# Patient Record
Sex: FEMALE | Race: WHITE | NOT HISPANIC OR LATINO | Employment: OTHER | RURAL
[De-identification: names, ages, dates, MRNs, and addresses within clinical notes are randomized per-mention and may not be internally consistent; named-entity substitution may affect disease eponyms.]

---

## 2023-04-17 ENCOUNTER — OFFICE VISIT (OUTPATIENT)
Dept: CARDIOLOGY | Facility: CLINIC | Age: 83
End: 2023-04-17
Payer: MEDICARE

## 2023-04-17 VITALS
HEART RATE: 92 BPM | HEIGHT: 64 IN | SYSTOLIC BLOOD PRESSURE: 144 MMHG | WEIGHT: 138 LBS | BODY MASS INDEX: 23.56 KG/M2 | DIASTOLIC BLOOD PRESSURE: 51 MMHG

## 2023-04-17 DIAGNOSIS — E78.2 HYPERLIPEMIA, MIXED: ICD-10-CM

## 2023-04-17 DIAGNOSIS — I10 HYPERTENSION, ESSENTIAL: ICD-10-CM

## 2023-04-17 DIAGNOSIS — R07.9 CHEST PAIN, UNSPECIFIED TYPE: Primary | ICD-10-CM

## 2023-04-17 PROCEDURE — 99203 OFFICE O/P NEW LOW 30 MIN: CPT | Performed by: SPECIALIST

## 2023-04-17 PROCEDURE — 1159F MED LIST DOCD IN RCRD: CPT | Performed by: SPECIALIST

## 2023-04-17 PROCEDURE — 1160F RVW MEDS BY RX/DR IN RCRD: CPT | Performed by: SPECIALIST

## 2023-04-17 RX ORDER — ATORVASTATIN CALCIUM 10 MG/1
1 TABLET, FILM COATED ORAL DAILY
COMMUNITY
Start: 2023-03-12

## 2023-04-17 RX ORDER — PANTOPRAZOLE SODIUM 40 MG/1
TABLET, DELAYED RELEASE ORAL
COMMUNITY
Start: 2023-03-30

## 2023-04-17 RX ORDER — LOSARTAN POTASSIUM 100 MG/1
1 TABLET ORAL DAILY
COMMUNITY
Start: 2023-03-12

## 2023-04-17 RX ORDER — FLUTICASONE PROPIONATE 50 MCG
SPRAY, SUSPENSION (ML) NASAL
COMMUNITY
Start: 2023-04-04

## 2023-04-17 NOTE — PROGRESS NOTES
[facility]   Cardiology Consult Note    Patient Name: Lul Meek  : 1940  Referring Physician: SALAS Lee  Subjective   Subjective     Reason for Consult/ Chief Complaint:   Chief Complaint   Patient presents with   • Chest Pain     Was having pain substernal, but since taking a new medication for heartburn and now it is ok       HPI:  Lul Meek is a 82 y.o. female with history of chest pain in the epigastric region rating into the past a few weeks ago.  Chest pain is substernal, aching, relieved spontaneously.  No exertional angina.  No shortness of breath.  No PND no orthopnea.    Review of Systems:    Constitutional no fever,  no weight loss   Skin no rash   Otolaryngeal no difficulty swallowing   Cardiovascular See HPI   Pulmonary no cough, no sputum production   Gastrointestinal no constipation, no diarrhea   Genitourinary no dysuria, no hematuria   Hematologic no easy bruisability, no abnormal bleeding   Musculoskeletal no muscle pain   Neurologic no dizziness, no falls       Personal History     Past Medical History:  Past Medical History:   Diagnosis Date   • Hyperlipidemia    • Hypertension        Family History:   Family History   Problem Relation Age of Onset   • Heart failure Father        Social History:  reports that she has never smoked. She has never used smokeless tobacco. She reports that she does not drink alcohol and does not use drugs.    Home Medications:  atorvastatin, fluticasone, losartan, metFORMIN, and pantoprazole    Allergies:  No Known Allergies    Objective    Objective     Vitals:   Heart Rate:  [90-92] 92  BP: (144-147)/(51-52) 144/51  Body mass index is 23.69 kg/m².  PHYSICAL EXAM:    General Appearance:   · well developed  · well nourished  HENT:   · oropharynx moist  · lips not cyanotic  Neck:  · thyroid not enlarged  · supple  Respiratory:  · no respiratory distress  · normal breath sounds  · no rales  Cardiovascular:  · no jugular venous  distention  · regular rhythm  · apical impulse normal  · S1 normal, S2 normal  · no S3, no S4   · no murmur  · no rub, no thrill  · carotid pulses normal; no bruit  · pedal pulses normal  · lower extremity edema: none    Skin:   · warm, dry  Psychiatric:  · judgement and insight appropriate  · normal mood and affect    RESULTS:    EKG reviewed by me and shows sinus rhythm       Result Review    Result Review:  I have personally reviewed the available results:  [x]  Laboratory  [x]  EKG/Telemetry   [x]  Cardiology/Vascular   [x] Medications  [x]  Old records      Procedures     Impression/Plan  1.  Precordial atypical chest pain with positive risk factors: Sestamibi stress test to evaluate any significant ischemia.  Echocardiogram evaluate left ventricular systolic function.  2.  Essential hypertension controlled: Continue losartan 100 mg once a day.  Monitor blood pressure regularly.  3.  Hyperlipidemia: Continue Lipitor 10 mg once a day.  Lipid and hepatic profile        Electronically signed by Edgar Bolanos MD, 04/17/23, 11:15 AM EDT.

## 2023-05-23 ENCOUNTER — TELEPHONE (OUTPATIENT)
Dept: CARDIOLOGY | Facility: CLINIC | Age: 83
End: 2023-05-23
Payer: MEDICARE

## 2023-05-23 NOTE — TELEPHONE ENCOUNTER
Notifed pt echocardiogram shows normal heart function and no significant valve abnormality. Keep follow up as scheduled. Notify office if symptoms persist/worsen

## 2023-05-23 NOTE — TELEPHONE ENCOUNTER
----- Message from Soheila Keith sent at 5/22/2023  1:16 PM EDT -----    ----- Message -----  From: Edgar Bolanos MD  Sent: 5/22/2023   1:10 PM EDT  To: Soheila Keith    Notify pt echocardiogram shows normal heart function and no significant valve abnormality. Keep follow up as scheduled. Notify office if symptoms persist/worsen

## 2023-05-30 ENCOUNTER — HOSPITAL ENCOUNTER (OUTPATIENT)
Dept: NUCLEAR MEDICINE | Facility: HOSPITAL | Age: 83
Discharge: HOME | End: 2023-05-30

## 2023-05-30 DIAGNOSIS — R07.9 CHEST PAIN, UNSPECIFIED TYPE: ICD-10-CM

## 2023-05-30 LAB
BH CV IMMEDIATE POST TECH DATA BLOOD PRESSURE: NORMAL MMHG
BH CV IMMEDIATE POST TECH DATA HEART RATE: 97 BPM
BH CV IMMEDIATE POST TECH DATA OXYGEN SATS: 98 %
BH CV REST NUCLEAR ISOTOPE DOSE: 9.4 MCI
BH CV SIX MINUTE RECOVERY TECH DATA BLOOD PRESSURE: NORMAL
BH CV SIX MINUTE RECOVERY TECH DATA HEART RATE: 86 BPM
BH CV SIX MINUTE RECOVERY TECH DATA OXYGEN SATURATION: 97 %
BH CV STRESS BP STAGE 1: NORMAL
BH CV STRESS COMMENTS STAGE 1: NORMAL
BH CV STRESS DOSE REGADENOSON STAGE 1: 0.4
BH CV STRESS DURATION MIN STAGE 1: 0
BH CV STRESS DURATION SEC STAGE 1: 10
BH CV STRESS HR STAGE 1: 80
BH CV STRESS NUCLEAR ISOTOPE DOSE: 35.5 MCI
BH CV STRESS O2 STAGE 1: 96
BH CV STRESS PROTOCOL 1: NORMAL
BH CV STRESS RECOVERY BP: NORMAL MMHG
BH CV STRESS RECOVERY HR: 86 BPM
BH CV STRESS RECOVERY O2: 97 %
BH CV STRESS STAGE 1: 1
BH CV THREE MINUTE POST TECH DATA BLOOD PRESSURE: NORMAL MMHG
BH CV THREE MINUTE POST TECH DATA HEART RATE: 86 BPM
BH CV THREE MINUTE POST TECH DATA OXYGEN SATURATION: 98 %
LV EF NUC BP: 50 %
MAXIMAL PREDICTED HEART RATE: 140 BPM
PERCENT MAX PREDICTED HR: 69.29 %
STRESS BASELINE BP: NORMAL MMHG
STRESS BASELINE HR: 75 BPM
STRESS O2 SAT REST: 95 %
STRESS PERCENT HR: 82 %
STRESS POST O2 SAT PEAK: 96 %
STRESS POST PEAK BP: NORMAL MMHG
STRESS POST PEAK HR: 97 BPM
STRESS TARGET HR: 119 BPM

## 2023-05-30 PROCEDURE — 25010000002 REGADENOSON 0.4 MG/5ML SOLUTION: Performed by: SPECIALIST

## 2023-05-30 PROCEDURE — A9502 TC99M TETROFOSMIN: HCPCS | Performed by: SPECIALIST

## 2023-05-30 PROCEDURE — 0 TECHNETIUM TETROFOSMIN KIT: Performed by: SPECIALIST

## 2023-05-30 PROCEDURE — 78452 HT MUSCLE IMAGE SPECT MULT: CPT

## 2023-05-30 PROCEDURE — 93017 CV STRESS TEST TRACING ONLY: CPT

## 2023-05-30 RX ORDER — REGADENOSON 0.08 MG/ML
0.4 INJECTION, SOLUTION INTRAVENOUS
Status: COMPLETED | OUTPATIENT
Start: 2023-05-30 | End: 2023-05-30

## 2023-05-30 RX ADMIN — TETROFOSMIN 1 DOSE: 1.38 INJECTION, POWDER, LYOPHILIZED, FOR SOLUTION INTRAVENOUS at 09:26

## 2023-05-30 RX ADMIN — TETROFOSMIN 1 DOSE: 1.38 INJECTION, POWDER, LYOPHILIZED, FOR SOLUTION INTRAVENOUS at 07:41

## 2023-05-30 RX ADMIN — REGADENOSON 0.4 MG: 0.08 INJECTION, SOLUTION INTRAVENOUS at 09:26

## 2023-05-31 ENCOUNTER — TELEPHONE (OUTPATIENT)
Dept: CARDIOLOGY | Facility: CLINIC | Age: 83
End: 2023-05-31

## 2023-05-31 NOTE — TELEPHONE ENCOUNTER
HARRISON patient regarding results and recommendations. Voiced understanding.   Patient denies any further CP. Patient advised to notify office if she starts having symptoms.

## 2023-05-31 NOTE — TELEPHONE ENCOUNTER
----- Message from SALAS Lewis sent at 5/30/2023  4:23 PM EDT -----  Notify pt stress test shows small fixed defect at the apex with no significant ischemia. Find out if she is having new or worsening chest pain. Start aspirin 81 mg daily and continue other medications.

## 2024-09-06 NOTE — PROGRESS NOTES
[facility]  Cardiology progress Note    Patient Name: Lul Meek  : 1940    CHIEF COMPLAINT  Hypertension        Subjective   Subjective     HISTORY OF PRESENT ILLNESS    Lul Meek is a 83 y.o. female with history of hypertension.  No chest pain.  Recently in the emergency room with dizziness and near syncopal spell.    REVIEW OF SYSTEMS    Constitutional:    No fever, no weight loss  Skin:     No rash  Otolaryngeal:    No difficulty swallowing  Cardiovascular: See HPI.  Pulmonary:    No cough, no sputum production    Personal History     Social History:    reports that she has never smoked. She has never used smokeless tobacco. She reports that she does not drink alcohol and does not use drugs.    Home Medications:  Current Outpatient Medications on File Prior to Visit   Medication Sig    atorvastatin (LIPITOR) 10 MG tablet Take 1 tablet by mouth Daily.    fluticasone (FLONASE) 50 MCG/ACT nasal spray USE 2 SPRAY(S) IN EACH NOSTRIL RIGHT HAND TO LEFT NOSTRIL AND LEFT HAND TO RIGHT NOSTRIL ONCE DAILY    losartan (COZAAR) 100 MG tablet Take 1 tablet by mouth Daily.    metFORMIN (GLUCOPHAGE) 500 MG tablet Take 1 tablet by mouth Every 12 (Twelve) Hours.    pantoprazole (PROTONIX) 40 MG EC tablet TAKE 1 TABLET BY MOUTH BEFORE BREAKFAST, STOP NEXIUM     No current facility-administered medications on file prior to visit.       Past Medical History:   Diagnosis Date    Hyperlipidemia     Hypertension        Allergies:  No Known Allergies    Objective    Objective       Vitals:   BP: ()/()   Arterial Line BP: ()/()   There is no height or weight on file to calculate BMI.     PHYSICAL EXAM:    General Appearance:   well developed  well nourished  HENT:   oropharynx moist  lips not cyanotic  Neck:  thyroid not enlarged  supple  Respiratory:  no respiratory distress  normal breath sounds  no rales  Cardiovascular:  no jugular venous distention  regular rhythm  apical impulse normal  S1 normal, S2  normal  no S3, no S4   no murmur  no rub, no thrill  carotid pulses normal; no bruit  pedal pulses normal  lower extremity edema: none    Skin:   warm, dry  Psychiatric:  judgement and insight appropriate  normal mood and affect        Result Review:  I have personally reviewed the available results from  [x]  Laboratory  [x]  EKG  [x]  Cardiology  [x]  Medications  [x]  Old records  []  Other:     Procedures    Results for orders placed in visit on 05/22/23    Adult Transthoracic Echo Complete W/ Cont if Necessary Per Protocol    Interpretation Summary  Mild left ventricular hypertrophy with adequate left ventricular systolic function ejection fraction 53%.  Fibrocalcific mitral and aortic valves.  Mild to moderate aortic regurgitation.  Mild mild regurgitation.  Mild tricuspid regurgitation.     Impression/Plan:  1.  Essential hypertension controlled: Continue losartan 100 mg once a day.  Monitor blood pressure regularly.  2.  Mixed hyperlipidemia: Continue Lipitor 10 mg once a day.  Monitor lipid and hepatic profile.  3.  Precordial atypical chest pain: Negative stress test.  4.  Mild aortic regurgitation: Asymptomatic.  5.  Dizziness/near syncopal spell: 48-hour Holter monitoring.  Reviewed her reports from the emergency room.        Edgar Bolanos MD   09/06/24   13:18 EDT

## 2024-09-09 ENCOUNTER — OFFICE VISIT (OUTPATIENT)
Dept: CARDIOLOGY | Facility: CLINIC | Age: 84
End: 2024-09-09
Payer: MEDICARE

## 2024-09-09 VITALS
HEART RATE: 90 BPM | BODY MASS INDEX: 25.34 KG/M2 | HEIGHT: 63 IN | WEIGHT: 143 LBS | SYSTOLIC BLOOD PRESSURE: 136 MMHG | DIASTOLIC BLOOD PRESSURE: 66 MMHG

## 2024-09-09 DIAGNOSIS — I35.1 NONRHEUMATIC AORTIC VALVE INSUFFICIENCY: ICD-10-CM

## 2024-09-09 DIAGNOSIS — R42 DIZZINESS: ICD-10-CM

## 2024-09-09 DIAGNOSIS — I10 HYPERTENSION, ESSENTIAL: Primary | ICD-10-CM

## 2024-09-09 DIAGNOSIS — E78.2 HYPERLIPEMIA, MIXED: ICD-10-CM

## 2024-09-09 PROCEDURE — 99214 OFFICE O/P EST MOD 30 MIN: CPT | Performed by: SPECIALIST

## 2024-09-09 RX ORDER — MONTELUKAST SODIUM 10 MG/1
TABLET ORAL
COMMUNITY
Start: 2024-08-18

## 2024-09-25 ENCOUNTER — HOSPITAL ENCOUNTER (INPATIENT)
Facility: HOSPITAL | Age: 84
LOS: 1 days | Discharge: HOME | End: 2024-09-27
Attending: INTERNAL MEDICINE | Admitting: INTERNAL MEDICINE
Payer: MEDICARE

## 2024-09-25 DIAGNOSIS — I44.2 COMPLETE HEART BLOCK: Primary | ICD-10-CM

## 2024-09-25 PROBLEM — E78.5 HLD (HYPERLIPIDEMIA): Status: ACTIVE | Noted: 2024-09-25

## 2024-09-25 PROBLEM — I44.1 2ND DEGREE AV BLOCK: Status: ACTIVE | Noted: 2024-09-25

## 2024-09-25 PROBLEM — I10 ESSENTIAL HYPERTENSION: Status: ACTIVE | Noted: 2024-09-25

## 2024-09-25 PROBLEM — E11.9 TYPE 2 DIABETES MELLITUS, WITHOUT LONG-TERM CURRENT USE OF INSULIN: Status: ACTIVE | Noted: 2024-09-25

## 2024-09-25 LAB
ANION GAP SERPL CALCULATED.3IONS-SCNC: 12.9 MMOL/L (ref 5–15)
BASOPHILS # BLD AUTO: 0.03 10*3/MM3 (ref 0–0.2)
BASOPHILS NFR BLD AUTO: 0.4 % (ref 0–1.5)
BUN SERPL-MCNC: 16 MG/DL (ref 8–23)
BUN/CREAT SERPL: 19.8 (ref 7–25)
CALCIUM SPEC-SCNC: 9.5 MG/DL (ref 8.6–10.5)
CHLORIDE SERPL-SCNC: 103 MMOL/L (ref 98–107)
CO2 SERPL-SCNC: 21.1 MMOL/L (ref 22–29)
CREAT SERPL-MCNC: 0.81 MG/DL (ref 0.57–1)
DEPRECATED RDW RBC AUTO: 46.8 FL (ref 37–54)
EGFRCR SERPLBLD CKD-EPI 2021: 73 ML/MIN/1.73
EOSINOPHIL # BLD AUTO: 0.13 10*3/MM3 (ref 0–0.4)
EOSINOPHIL NFR BLD AUTO: 1.6 % (ref 0.3–6.2)
ERYTHROCYTE [DISTWIDTH] IN BLOOD BY AUTOMATED COUNT: 13.6 % (ref 12.3–15.4)
GLUCOSE BLDC GLUCOMTR-MCNC: 134 MG/DL (ref 70–99)
GLUCOSE BLDC GLUCOMTR-MCNC: 164 MG/DL (ref 70–99)
GLUCOSE SERPL-MCNC: 131 MG/DL (ref 65–99)
HCT VFR BLD AUTO: 33.7 % (ref 34–46.6)
HGB BLD-MCNC: 10.8 G/DL (ref 12–15.9)
HOLD SPECIMEN: NORMAL
IMM GRANULOCYTES # BLD AUTO: 0.09 10*3/MM3 (ref 0–0.05)
IMM GRANULOCYTES NFR BLD AUTO: 1.1 % (ref 0–0.5)
LYMPHOCYTES # BLD AUTO: 2.58 10*3/MM3 (ref 0.7–3.1)
LYMPHOCYTES NFR BLD AUTO: 31 % (ref 19.6–45.3)
MCH RBC QN AUTO: 30.1 PG (ref 26.6–33)
MCHC RBC AUTO-ENTMCNC: 32 G/DL (ref 31.5–35.7)
MCV RBC AUTO: 93.9 FL (ref 79–97)
MONOCYTES # BLD AUTO: 0.48 10*3/MM3 (ref 0.1–0.9)
MONOCYTES NFR BLD AUTO: 5.8 % (ref 5–12)
NEUTROPHILS NFR BLD AUTO: 5.02 10*3/MM3 (ref 1.7–7)
NEUTROPHILS NFR BLD AUTO: 60.1 % (ref 42.7–76)
NRBC BLD AUTO-RTO: 0 /100 WBC (ref 0–0.2)
PLATELET # BLD AUTO: 280 10*3/MM3 (ref 140–450)
PMV BLD AUTO: 9.7 FL (ref 6–12)
POTASSIUM SERPL-SCNC: 4.4 MMOL/L (ref 3.5–5.2)
RBC # BLD AUTO: 3.59 10*6/MM3 (ref 3.77–5.28)
SODIUM SERPL-SCNC: 137 MMOL/L (ref 136–145)
WBC NRBC COR # BLD AUTO: 8.33 10*3/MM3 (ref 3.4–10.8)
WHOLE BLOOD HOLD COAG: NORMAL

## 2024-09-25 PROCEDURE — G0378 HOSPITAL OBSERVATION PER HR: HCPCS

## 2024-09-25 PROCEDURE — 85025 COMPLETE CBC W/AUTO DIFF WBC: CPT | Performed by: STUDENT IN AN ORGANIZED HEALTH CARE EDUCATION/TRAINING PROGRAM

## 2024-09-25 PROCEDURE — 82948 REAGENT STRIP/BLOOD GLUCOSE: CPT

## 2024-09-25 PROCEDURE — 93005 ELECTROCARDIOGRAM TRACING: CPT | Performed by: INTERNAL MEDICINE

## 2024-09-25 PROCEDURE — 99214 OFFICE O/P EST MOD 30 MIN: CPT | Performed by: INTERNAL MEDICINE

## 2024-09-25 PROCEDURE — 93010 ELECTROCARDIOGRAM REPORT: CPT | Performed by: INTERNAL MEDICINE

## 2024-09-25 PROCEDURE — 99222 1ST HOSP IP/OBS MODERATE 55: CPT | Performed by: STUDENT IN AN ORGANIZED HEALTH CARE EDUCATION/TRAINING PROGRAM

## 2024-09-25 PROCEDURE — 80048 BASIC METABOLIC PNL TOTAL CA: CPT | Performed by: STUDENT IN AN ORGANIZED HEALTH CARE EDUCATION/TRAINING PROGRAM

## 2024-09-25 RX ORDER — SODIUM CHLORIDE 0.9 % (FLUSH) 0.9 %
10 SYRINGE (ML) INJECTION EVERY 12 HOURS SCHEDULED
Status: DISCONTINUED | OUTPATIENT
Start: 2024-09-25 | End: 2024-09-27 | Stop reason: HOSPADM

## 2024-09-25 RX ORDER — ACETAMINOPHEN 650 MG/1
650 SUPPOSITORY RECTAL EVERY 4 HOURS PRN
Status: DISCONTINUED | OUTPATIENT
Start: 2024-09-25 | End: 2024-09-27 | Stop reason: HOSPADM

## 2024-09-25 RX ORDER — BISACODYL 10 MG
10 SUPPOSITORY, RECTAL RECTAL DAILY PRN
Status: DISCONTINUED | OUTPATIENT
Start: 2024-09-25 | End: 2024-09-27 | Stop reason: HOSPADM

## 2024-09-25 RX ORDER — AMOXICILLIN 250 MG
2 CAPSULE ORAL 2 TIMES DAILY
Status: DISCONTINUED | OUTPATIENT
Start: 2024-09-25 | End: 2024-09-27 | Stop reason: HOSPADM

## 2024-09-25 RX ORDER — BISACODYL 5 MG/1
5 TABLET, DELAYED RELEASE ORAL DAILY PRN
Status: DISCONTINUED | OUTPATIENT
Start: 2024-09-25 | End: 2024-09-27 | Stop reason: HOSPADM

## 2024-09-25 RX ORDER — ACETAMINOPHEN 325 MG/1
650 TABLET ORAL EVERY 4 HOURS PRN
Status: DISCONTINUED | OUTPATIENT
Start: 2024-09-25 | End: 2024-09-27 | Stop reason: HOSPADM

## 2024-09-25 RX ORDER — SODIUM CHLORIDE 0.9 % (FLUSH) 0.9 %
10 SYRINGE (ML) INJECTION AS NEEDED
Status: DISCONTINUED | OUTPATIENT
Start: 2024-09-25 | End: 2024-09-27 | Stop reason: HOSPADM

## 2024-09-25 RX ORDER — SODIUM CHLORIDE 9 MG/ML
40 INJECTION, SOLUTION INTRAVENOUS AS NEEDED
Status: DISCONTINUED | OUTPATIENT
Start: 2024-09-25 | End: 2024-09-27 | Stop reason: HOSPADM

## 2024-09-25 RX ORDER — POLYETHYLENE GLYCOL 3350 17 G/17G
17 POWDER, FOR SOLUTION ORAL DAILY PRN
Status: DISCONTINUED | OUTPATIENT
Start: 2024-09-25 | End: 2024-09-27 | Stop reason: HOSPADM

## 2024-09-25 RX ORDER — IBUPROFEN 600 MG/1
1 TABLET ORAL
Status: DISCONTINUED | OUTPATIENT
Start: 2024-09-25 | End: 2024-09-27 | Stop reason: HOSPADM

## 2024-09-25 RX ORDER — NICOTINE POLACRILEX 4 MG
15 LOZENGE BUCCAL
Status: DISCONTINUED | OUTPATIENT
Start: 2024-09-25 | End: 2024-09-27 | Stop reason: HOSPADM

## 2024-09-25 RX ORDER — INSULIN LISPRO 100 [IU]/ML
2-9 INJECTION, SOLUTION INTRAVENOUS; SUBCUTANEOUS
Status: DISCONTINUED | OUTPATIENT
Start: 2024-09-25 | End: 2024-09-25

## 2024-09-25 RX ORDER — DEXTROSE MONOHYDRATE 25 G/50ML
25 INJECTION, SOLUTION INTRAVENOUS
Status: DISCONTINUED | OUTPATIENT
Start: 2024-09-25 | End: 2024-09-27 | Stop reason: HOSPADM

## 2024-09-25 NOTE — Clinical Note
A 7 fr sheath was successfully inserted using micropuncture technique into the left subclavian vein. Sheath insertion not delayed.

## 2024-09-25 NOTE — SIGNIFICANT NOTE
83 female history of hypertension follows with Dr. Taran MEJIA seen earlier this month for issues regarding dizziness and near syncope.  Has had a negative stress test last year.  Apparently was going to her PCPs office was noted to have bradycardia near high 30s low 40s was sent to the ER for further evaluation.  Was not having acute symptoms for this, EKG at outside hospital reportedly showing a new secondary AV block.  Cardiology/Dr. Pires was notified and accepted patient/would see on transfer.    ER workup with chest x-ray reportedly showing some mild vascular congestion.  Bnp - 1105 slightly high for their value, trop 10.1 (wnl for their range) and Cr 1.13.  bmp otherwise unremarkable.      159/73, HR 38  98% RA

## 2024-09-25 NOTE — Clinical Note
Prescription refill request    Medication Name: Albuterol HFA INH (200 PUFFS)  Proair HFA Oral INH (200 PFS)   Medication dosage: 5.5 gm, 8.5 g  Patient is requesting a: 8.5  Pharmacy Name: [facility]   Pharmacy Location: [address] [place] [address] [place]     Advised patient that the nurse will call if there are questions or concerns, otherwise refill processing may take 24-48 hours.  Patient instructed to call pharmacy directly for future refills.    Fax    Sheath peeled away.

## 2024-09-25 NOTE — H&P
Patient Care Team:  Iman Lemus APRN as PCP - General (Family Medicine)    Chief complaint Transfer from [facility] for Complete Heart Block    Subjective     Patient is a 83 y.o. female presents as transfer from [facility] after being found to have complete heart block.  Patient initially presented to her doctor's office because she was feeling episodes of weakness and near syncope.  She thought her blood sugar was low.  Patient denies chest pain, shortness of breath, palpitations.  When she presented here she did have a heart rate in the 30s and is in complete heart block.  She was initially transferred to 4 MTU however will be transferred to the ICU per cardiology request to saw the patient right before I did.  Patient has been hemodynamically stable otherwise        Review of Systems   Pertinent items are noted in HPI    History  Past Medical History:   Diagnosis Date    Diabetes mellitus     Elevated cholesterol     Hyperlipidemia     Hypertension      Past Surgical History:   Procedure Laterality Date    CHOLECYSTECTOMY  2018    COLONOSCOPY      EYE SURGERY       Family History   Problem Relation Age of Onset    Heart failure Father     Breast cancer Sister      Social History     Tobacco Use    Smoking status: Never    Smokeless tobacco: Never   Vaping Use    Vaping status: Never Used   Substance Use Topics    Alcohol use: Never    Drug use: Never     Medications Prior to Admission   Medication Sig Dispense Refill Last Dose    atorvastatin (LIPITOR) 10 MG tablet Take 1 tablet by mouth Daily.       Dexchlorpheniramine-Phenyleph (STAHIST PO) Take  by mouth.       fluticasone (FLONASE) 50 MCG/ACT nasal spray USE 2 SPRAY(S) IN EACH NOSTRIL RIGHT HAND TO LEFT NOSTRIL AND LEFT HAND TO RIGHT NOSTRIL ONCE DAILY       losartan (COZAAR) 100 MG tablet Take 1 tablet by mouth Daily.       metFORMIN (GLUCOPHAGE) 500 MG tablet Take 1 tablet by mouth Every 12 (Twelve) Hours.       montelukast (SINGULAIR) 10 MG tablet  TAKE 1 TABLET BY MOUTH ONCE DAILY AT BEDTIME FOR ALLERGIES       pantoprazole (PROTONIX) 40 MG EC tablet TAKE 1 TABLET BY MOUTH BEFORE BREAKFAST, STOP NEXIUM        Allergies:  Patient has no known allergies.    Objective     Vital Signs  Temp:  [97.3 °F (36.3 °C)] 97.3 °F (36.3 °C)  Heart Rate:  [37] 37  BP: (160)/(55) 160/55    Physical Exam:      General Appearance:  Alert, cooperative, in no acute distress   Head:  Normocephalic, without obvious abnormality, atraumatic   Eyes:  Lids and lashes normal, conjunctivae and sclerae normal, no icterus, no pallor, corneas clear, PERRLA   Ears:  Ears appear intact with no abnormalities noted   Throat:  No oral lesions, no thrush, oral mucosa moist   Neck:  No adenopathy, supple, trachea midline, no thyromegaly, no carotid bruit, no JVD   Back:  No kyphosis present, no scoliosis present, no skin lesions, erythema or scars, no tenderness to percussion, or palpation, range of motion normal   Lungs:  Clear to auscultation,respirations regular, even and unlabored    Heart:  Regular rhythm and normal rate, normal S1 and S2, no murmur, no gallop, no rub, no click   Breast Exam:  Deferred   Abdomen:  Normal bowel sounds, no masses, no organomegaly, soft non-tender, non-distended, no guarding, no rebound tenderness   Genitalia:  Deferred   Extremities:  Moves all extremities well, no edema, no cyanosis, no redness   Pulses:  Pulses palpable and equal bilaterally   Skin:  No bleeding, bruising or rash   Lymph nodes:  No palpable adenopathy   Neurologic:  Cranial nerves 2 - 12 grossly intact, sensation intact, DTR present and equal bilaterally       Results Review:    I reviewed the patient's new clinical results.  I reviewed the patient's new imaging results and agree with the interpretation.  I reviewed the patient's other test results and agree with the interpretation  I personally viewed and interpreted the patient's EKG/Telemetry data    Assessment & Plan       Complete heart  block    Essential hypertension    HLD (hyperlipidemia)      Admit to ICU  Telemetry  Hold all anna blocking agents  Closely monitor hemodynamics  Daily labs  Appreciate cardiology input  Restart pertinent home medications  Full Code    Jose L House MD  09/25/24  15:37 EDT

## 2024-09-25 NOTE — Clinical Note
Admitted/transferred from:   2 RN skin assessment completed by: Nahomi Harris, HUY and Jarrod Grey RN.   Skin assessment finding: Left lower groin opening due to pt popping cyst, site is dry, band-aid applied. Minor scratches on back, looks reddened but skin is intact. No other skin deficits noted.   Interventions/actions: Pt declined mepilex. Does ambulate well. PO intake encouraged, skin moist free.     Will continue to monitor.     Images of subclavian vein obtained following contrast bolus.

## 2024-09-25 NOTE — CONSULTS
"  [facility]   Cardiology Consult Note    Patient Name: Lul Meek  : 1940  MRN: [id number]  Primary Care Physician:  Iman Lemus APRN  Referring Physician: No Known Provider  Date of admission: 2024    Subjective   Subjective     Reason for Consult/ Chief Complaint: Complete heart block    HPI:  Lul Meek is a 83 y.o. female with history of hypertension and hyperlipidemia who was seen by her primary cardiologist about 2 weeks ago for a presyncopal episode.  He did order a Holter monitor and it was scheduled to be put on .  She states she went to her primary care today after walking and noted that her \"legs quit on her.\"  She did not fall.  She did note her glucose was elevated.  Apparently at her primary care's office they noted bradycardia and sent her to an outside hospital.  She was transferred here.  She states right now she feels fine.  Her blood pressure is stable.  She is in what appears to be complete heart block with a heart rate of about 35 bpm.  She notes no new medications and is not on any rate controlling medications.    Review of Systems   All systems were reviewed and negative except for: Presyncopal episode a couple weeks ago.  Leg weakness.    Personal History     Past Medical History:   Diagnosis Date    Diabetes mellitus     Elevated cholesterol     Hyperlipidemia     Hypertension         Past medical history reviewed      Family History: family history includes Breast cancer in her sister; Heart failure in her father. Otherwise pertinent FHx was reviewed and not pertinent to current issue.    Social History:  reports that she has never smoked. She has never used smokeless tobacco. She reports that she does not drink alcohol and does not use drugs.    Home Medications:  Dexchlorpheniramine-Phenyleph, atorvastatin, fluticasone, losartan, metFORMIN, montelukast, and pantoprazole    Allergies:  No Known Allergies    Objective    Objective     Vitals: " "  Temp:  [97.3 °F (36.3 °C)] 97.3 °F (36.3 °C)  Heart Rate:  [37] 37  BP: (160)/(55) 160/55      Physical Exam:   Constitutional: Awake, alert, No acute distress    Eyes: PERRLA, sclerae anicteric, no conjunctival injection   HENT: NCAT, mucous membranes moist   Neck: Supple, no thyromegaly, no lymphadenopathy, trachea midline   Respiratory: Clear to auscultation bilaterally, nonlabored respirations    Cardiovascular: RRR, no murmurs, rubs, or gallops, palpable pedal pulses bilaterally   Gastrointestinal: Positive bowel sounds, soft, nontender, nondistended   Musculoskeletal: No bilateral ankle edema, no clubbing or cyanosis to extremities   Psychiatric: Appropriate affect, cooperative   Neurologic: Oriented x 3, strength symmetric in all extremities, Cranial Nerves grossly intact to confrontation, speech clear   Skin: No rashes     Result Review    Result Review:  I have personally reviewed the results from the time of this admission to 9/25/2024 15:38 EDT and agree with these findings:  [x]  Laboratory  []  Microbiology  [x]  Radiology  [x]  EKG/Telemetry   [x]  Cardiology/Vascular   []  Pathology  [x]  Old records  []  Other:  Most notable findings include:           No results found for: \"TROPONINT\"      Assessment & Plan   Assessment / Plan     Brief Patient Summary:  Lul Meek is a 83 y.o. female who has a history of diabetes, hypertension and hyperlipidemia.  She had a previous syncopal episode a couple weeks ago.  A Holter was ordered but it was scheduled not until the 30th of this month.  She went walking with her friends and noted that her legs \"quit on her.\"  She went to her primary care doctor who noted her to be bradycardic.  She was sent to an outside hospital and then transferred here.    Active Hospital Problems:  Active Hospital Problems    Diagnosis     **2nd degree AV block        Assessment:  1.  Complete heart block  2.  Hypertension  3.  Hyperlipidemia    Plan:   1.  Patient had an " echocardiogram done May 2023.  At that time the ejection fraction was 53% with mild to moderate aortic insufficiency.  Will repeat an echocardiogram at this point.  This was also ordered as an outpatient but had not been scheduled yet.  2.  Make sure patient has regular labs including thyroid checked along with potassium.  3.  Patient's not on any rate controlling medications.  4.  Dr. Bolanos will take over her care in the morning as he follows her as an outpatient.  5.  I certainly do not feel like the patient needs a temporary pacemaker at this point.  I do think that we need to transfer her to the ICU to keep a closer eye on her.  I have discussed this with the nurse and she is contacted the hospitalist.    Electronically signed by Jorge Pires MD, 09/25/24, 3:38 PM EDT.

## 2024-09-26 ENCOUNTER — APPOINTMENT (OUTPATIENT)
Dept: GENERAL RADIOLOGY | Facility: HOSPITAL | Age: 84
End: 2024-09-26
Payer: MEDICARE

## 2024-09-26 LAB
ANION GAP SERPL CALCULATED.3IONS-SCNC: 9.9 MMOL/L (ref 5–15)
BASOPHILS # BLD AUTO: 0.03 10*3/MM3 (ref 0–0.2)
BASOPHILS NFR BLD AUTO: 0.4 % (ref 0–1.5)
BUN SERPL-MCNC: 18 MG/DL (ref 8–23)
BUN/CREAT SERPL: 21.2 (ref 7–25)
CALCIUM SPEC-SCNC: 8.8 MG/DL (ref 8.6–10.5)
CHLORIDE SERPL-SCNC: 106 MMOL/L (ref 98–107)
CO2 SERPL-SCNC: 23.1 MMOL/L (ref 22–29)
CREAT SERPL-MCNC: 0.85 MG/DL (ref 0.57–1)
DEPRECATED RDW RBC AUTO: 46.5 FL (ref 37–54)
EGFRCR SERPLBLD CKD-EPI 2021: 68.9 ML/MIN/1.73
EOSINOPHIL # BLD AUTO: 0.2 10*3/MM3 (ref 0–0.4)
EOSINOPHIL NFR BLD AUTO: 2.7 % (ref 0.3–6.2)
ERYTHROCYTE [DISTWIDTH] IN BLOOD BY AUTOMATED COUNT: 13.7 % (ref 12.3–15.4)
GLUCOSE BLDC GLUCOMTR-MCNC: 141 MG/DL (ref 70–99)
GLUCOSE BLDC GLUCOMTR-MCNC: 145 MG/DL (ref 70–99)
GLUCOSE BLDC GLUCOMTR-MCNC: 147 MG/DL (ref 70–99)
GLUCOSE BLDC GLUCOMTR-MCNC: 151 MG/DL (ref 70–99)
GLUCOSE SERPL-MCNC: 148 MG/DL (ref 65–99)
HCT VFR BLD AUTO: 31 % (ref 34–46.6)
HGB BLD-MCNC: 10 G/DL (ref 12–15.9)
IMM GRANULOCYTES # BLD AUTO: 0.02 10*3/MM3 (ref 0–0.05)
IMM GRANULOCYTES NFR BLD AUTO: 0.3 % (ref 0–0.5)
LYMPHOCYTES # BLD AUTO: 2.45 10*3/MM3 (ref 0.7–3.1)
LYMPHOCYTES NFR BLD AUTO: 33.3 % (ref 19.6–45.3)
MCH RBC QN AUTO: 30.2 PG (ref 26.6–33)
MCHC RBC AUTO-ENTMCNC: 32.3 G/DL (ref 31.5–35.7)
MCV RBC AUTO: 93.7 FL (ref 79–97)
MONOCYTES # BLD AUTO: 0.58 10*3/MM3 (ref 0.1–0.9)
MONOCYTES NFR BLD AUTO: 7.9 % (ref 5–12)
NEUTROPHILS NFR BLD AUTO: 4.08 10*3/MM3 (ref 1.7–7)
NEUTROPHILS NFR BLD AUTO: 55.4 % (ref 42.7–76)
NRBC BLD AUTO-RTO: 0 /100 WBC (ref 0–0.2)
PLATELET # BLD AUTO: 241 10*3/MM3 (ref 140–450)
PMV BLD AUTO: 9.4 FL (ref 6–12)
POTASSIUM SERPL-SCNC: 4 MMOL/L (ref 3.5–5.2)
RBC # BLD AUTO: 3.31 10*6/MM3 (ref 3.77–5.28)
SODIUM SERPL-SCNC: 139 MMOL/L (ref 136–145)
TSH SERPL DL<=0.05 MIU/L-ACNC: 2.73 UIU/ML (ref 0.27–4.2)
WBC NRBC COR # BLD AUTO: 7.36 10*3/MM3 (ref 3.4–10.8)

## 2024-09-26 PROCEDURE — C1898 LEAD, PMKR, OTHER THAN TRANS: HCPCS | Performed by: INTERNAL MEDICINE

## 2024-09-26 PROCEDURE — 82948 REAGENT STRIP/BLOOD GLUCOSE: CPT | Performed by: STUDENT IN AN ORGANIZED HEALTH CARE EDUCATION/TRAINING PROGRAM

## 2024-09-26 PROCEDURE — 0JH606Z INSERTION OF PACEMAKER, DUAL CHAMBER INTO CHEST SUBCUTANEOUS TISSUE AND FASCIA, OPEN APPROACH: ICD-10-PCS | Performed by: INTERNAL MEDICINE

## 2024-09-26 PROCEDURE — 25010000002 FENTANYL CITRATE (PF) 100 MCG/2ML SOLUTION: Performed by: INTERNAL MEDICINE

## 2024-09-26 PROCEDURE — 84443 ASSAY THYROID STIM HORMONE: CPT | Performed by: INTERNAL MEDICINE

## 2024-09-26 PROCEDURE — 25810000003 SODIUM CHLORIDE 0.9 % SOLUTION: Performed by: INTERNAL MEDICINE

## 2024-09-26 PROCEDURE — 80048 BASIC METABOLIC PNL TOTAL CA: CPT | Performed by: STUDENT IN AN ORGANIZED HEALTH CARE EDUCATION/TRAINING PROGRAM

## 2024-09-26 PROCEDURE — C1785 PMKR, DUAL, RATE-RESP: HCPCS | Performed by: INTERNAL MEDICINE

## 2024-09-26 PROCEDURE — 25010000002 MIDAZOLAM PER 1MG: Performed by: INTERNAL MEDICINE

## 2024-09-26 PROCEDURE — 25010000002 CEFAZOLIN PER 500 MG: Performed by: INTERNAL MEDICINE

## 2024-09-26 PROCEDURE — 33208 INSRT HEART PM ATRIAL & VENT: CPT | Performed by: INTERNAL MEDICINE

## 2024-09-26 PROCEDURE — 71045 X-RAY EXAM CHEST 1 VIEW: CPT

## 2024-09-26 PROCEDURE — 85025 COMPLETE CBC W/AUTO DIFF WBC: CPT | Performed by: STUDENT IN AN ORGANIZED HEALTH CARE EDUCATION/TRAINING PROGRAM

## 2024-09-26 PROCEDURE — 25010000002 BUPIVACAINE (PF) 0.5 % SOLUTION: Performed by: INTERNAL MEDICINE

## 2024-09-26 PROCEDURE — C1892 INTRO/SHEATH,FIXED,PEEL-AWAY: HCPCS | Performed by: INTERNAL MEDICINE

## 2024-09-26 PROCEDURE — 02H63JZ INSERTION OF PACEMAKER LEAD INTO RIGHT ATRIUM, PERCUTANEOUS APPROACH: ICD-10-PCS | Performed by: INTERNAL MEDICINE

## 2024-09-26 PROCEDURE — 99233 SBSQ HOSP IP/OBS HIGH 50: CPT | Performed by: INTERNAL MEDICINE

## 2024-09-26 PROCEDURE — 25510000001 IOPAMIDOL PER 1 ML: Performed by: INTERNAL MEDICINE

## 2024-09-26 PROCEDURE — 02HK3JZ INSERTION OF PACEMAKER LEAD INTO RIGHT VENTRICLE, PERCUTANEOUS APPROACH: ICD-10-PCS | Performed by: INTERNAL MEDICINE

## 2024-09-26 PROCEDURE — 82948 REAGENT STRIP/BLOOD GLUCOSE: CPT | Performed by: INTERNAL MEDICINE

## 2024-09-26 PROCEDURE — 99214 OFFICE O/P EST MOD 30 MIN: CPT | Performed by: INTERNAL MEDICINE

## 2024-09-26 PROCEDURE — C1894 INTRO/SHEATH, NON-LASER: HCPCS | Performed by: INTERNAL MEDICINE

## 2024-09-26 PROCEDURE — 82948 REAGENT STRIP/BLOOD GLUCOSE: CPT

## 2024-09-26 DEVICE — PACE/SENSE LEAD
Type: IMPLANTABLE DEVICE | Status: FUNCTIONAL
Brand: INGEVITY™+

## 2024-09-26 DEVICE — STEROX BIPOLAR IS-1 ATRIAL/VENTRICULAR
Type: IMPLANTABLE DEVICE | Status: FUNCTIONAL
Brand: FINELINE® II EZ STEROX

## 2024-09-26 DEVICE — PACEMAKER
Type: IMPLANTABLE DEVICE | Status: FUNCTIONAL
Brand: ACCOLADE™ MRI EL DR

## 2024-09-26 RX ORDER — SODIUM CHLORIDE 0.9 % (FLUSH) 0.9 %
10 SYRINGE (ML) INJECTION EVERY 12 HOURS SCHEDULED
Status: DISCONTINUED | OUTPATIENT
Start: 2024-09-26 | End: 2024-09-27 | Stop reason: HOSPADM

## 2024-09-26 RX ORDER — ACETAMINOPHEN 325 MG/1
650 TABLET ORAL EVERY 8 HOURS
Status: DISCONTINUED | OUTPATIENT
Start: 2024-09-26 | End: 2024-09-27 | Stop reason: HOSPADM

## 2024-09-26 RX ORDER — MIDAZOLAM HYDROCHLORIDE 2 MG/2ML
INJECTION, SOLUTION INTRAMUSCULAR; INTRAVENOUS
Status: DISCONTINUED | OUTPATIENT
Start: 2024-09-26 | End: 2024-09-26 | Stop reason: HOSPADM

## 2024-09-26 RX ORDER — SODIUM CHLORIDE 0.9 % (FLUSH) 0.9 %
10 SYRINGE (ML) INJECTION AS NEEDED
Status: DISCONTINUED | OUTPATIENT
Start: 2024-09-26 | End: 2024-09-27 | Stop reason: HOSPADM

## 2024-09-26 RX ORDER — BUPIVACAINE HYDROCHLORIDE 5 MG/ML
INJECTION, SOLUTION EPIDURAL; INTRACAUDAL
Status: DISCONTINUED | OUTPATIENT
Start: 2024-09-26 | End: 2024-09-26 | Stop reason: HOSPADM

## 2024-09-26 RX ORDER — HYDROCODONE BITARTRATE AND ACETAMINOPHEN 5; 325 MG/1; MG/1
1 TABLET ORAL EVERY 6 HOURS PRN
Status: DISCONTINUED | OUTPATIENT
Start: 2024-09-26 | End: 2024-09-27 | Stop reason: HOSPADM

## 2024-09-26 RX ORDER — SODIUM CHLORIDE 9 MG/ML
40 INJECTION, SOLUTION INTRAVENOUS AS NEEDED
Status: DISCONTINUED | OUTPATIENT
Start: 2024-09-26 | End: 2024-09-26

## 2024-09-26 RX ORDER — FENTANYL CITRATE 50 UG/ML
INJECTION, SOLUTION INTRAMUSCULAR; INTRAVENOUS
Status: DISCONTINUED | OUTPATIENT
Start: 2024-09-26 | End: 2024-09-26 | Stop reason: HOSPADM

## 2024-09-26 RX ORDER — SODIUM CHLORIDE 9 MG/ML
40 INJECTION, SOLUTION INTRAVENOUS AS NEEDED
Status: DISCONTINUED | OUTPATIENT
Start: 2024-09-26 | End: 2024-09-27 | Stop reason: HOSPADM

## 2024-09-26 RX ORDER — ACETAMINOPHEN 650 MG/1
650 SUPPOSITORY RECTAL EVERY 8 HOURS
Status: DISCONTINUED | OUTPATIENT
Start: 2024-09-26 | End: 2024-09-27 | Stop reason: HOSPADM

## 2024-09-26 RX ORDER — IOPAMIDOL 755 MG/ML
INJECTION, SOLUTION INTRAVASCULAR
Status: DISCONTINUED | OUTPATIENT
Start: 2024-09-26 | End: 2024-09-26 | Stop reason: HOSPADM

## 2024-09-26 RX ORDER — LIDOCAINE HYDROCHLORIDE 20 MG/ML
INJECTION, SOLUTION INFILTRATION; PERINEURAL
Status: DISCONTINUED | OUTPATIENT
Start: 2024-09-26 | End: 2024-09-26 | Stop reason: HOSPADM

## 2024-09-26 RX ORDER — SODIUM CHLORIDE 0.9 % (FLUSH) 0.9 %
10 SYRINGE (ML) INJECTION EVERY 12 HOURS SCHEDULED
Status: DISCONTINUED | OUTPATIENT
Start: 2024-09-26 | End: 2024-09-26

## 2024-09-26 RX ORDER — ACETAMINOPHEN 160 MG/5ML
650 SOLUTION ORAL EVERY 8 HOURS
Status: DISCONTINUED | OUTPATIENT
Start: 2024-09-26 | End: 2024-09-27 | Stop reason: HOSPADM

## 2024-09-26 RX ORDER — SODIUM CHLORIDE 0.9 % (FLUSH) 0.9 %
10 SYRINGE (ML) INJECTION AS NEEDED
Status: DISCONTINUED | OUTPATIENT
Start: 2024-09-26 | End: 2024-09-26

## 2024-09-26 RX ORDER — SODIUM CHLORIDE 9 MG/ML
150 INJECTION, SOLUTION INTRAVENOUS CONTINUOUS
Status: DISCONTINUED | OUTPATIENT
Start: 2024-09-26 | End: 2024-09-27 | Stop reason: HOSPADM

## 2024-09-26 RX ADMIN — ACETAMINOPHEN 650 MG: 325 TABLET ORAL at 18:04

## 2024-09-26 RX ADMIN — SODIUM CHLORIDE 2000 MG: 9 INJECTION, SOLUTION INTRAVENOUS at 21:29

## 2024-09-26 RX ADMIN — Medication 10 ML: at 08:18

## 2024-09-26 RX ADMIN — Medication 10 ML: at 11:28

## 2024-09-26 RX ADMIN — SODIUM CHLORIDE 150 ML/HR: 9 INJECTION, SOLUTION INTRAVENOUS at 11:27

## 2024-09-26 NOTE — PLAN OF CARE
DATE:  05/15/2017

 

Mr. Dunlap tells me that he is feeling quite well today.  He was able to sleep in

completely horizontal position without any difficulty.  He denies any chest

discomfort.  He denies any sensation of palpitations and he has no pain in his

feet.  Unfortunately, he has virtually no recollection of the events that led to

his current admission.

 

Vital signs:  Blood pressure has been running on the high side, this morning

156/76.  Heart rate is in 70s.  He is afebrile.  Saturation is in low to mid 90s

on room air.  Fluid balance yesterday was about 1800 mL negative.  Documented

weight is 92.8 kg.  He is alert and oriented and appropriate.  I do not

appreciate any obvious jugular venous pulse (JVP) elevation.  Lungs reveal

bilateral end inspiratory crackles but no wheezing.  Heart exam reveals somewhat

muffled heart sounds but it is regular.  I do not appreciate any gallop or

obvious rub or murmur.  Abdomen is obese but soft.  There is no significant

peripheral edema.  Both of his feet are bandaged and there reportedly open wounds

on mostly heels.

 

LABORATORY DATA:  CBC reveals hemoglobin 10, hematocrit 32 and platelet count

178, WBC count 6.7.  Basic metabolic panel:  Potassium 4.8, BUN 50, creatinine

2.9 for calculated GFR 23 and glucose 148.  Albumin is 2.3.  His urinalysis was

positive for blood but also positive for protein 2+.

 

Review of telemetry strips reveal sinus rhythm.  He had one episode of

nonsustained ventricular tachycardia lasting 4 beats.  I do not appreciate any

bradycardia.

 

ASSESSMENT AND PLAN:  Mr. Dunlap is a 73-year-old man who has known ischemic

cardiomyopathy with severe left ventricular systolic dysfunction.  He presented

with altered mental status.  It is not completely clear what was the etiology and

effect of pain medications.  Metabolic encephalopathy and possibly also

hypochromic glycemia are all in differential diagnosis.  Concomitantly, he has

congestive heart failure.  His diuretics were held for a few days and I do not

foresee him as grossly volume overloaded.  Also, his beta-blockers were held due

to episodes of bradycardia on presentation.  At this point, I think we can

reintroduce small dose of carvedilol and I ordered 3.125 mg twice a day with

holding parameters.  Also, because his blood pressure is elevated, I will put him

on combination of isosorbide and hydralazine, which are evidence base choices

rather than amlodipine that does not have any beneficial effect on outcomes in

people with cardiomyopathy and severe left ventricular (LV) systolic dysfunction.

We can titrate the doses upwardly as tolerated.  Certainly, he is not a candidate

for angiotensin receptor blocker (ARB) or angiotensin-converting enzyme (ACE)

inhibitor because of an underlying renal dysfunction.  I will leave the diuretic

dosing to nephrology that has been regulating it since his admission. Goal Outcome Evaluation:  Plan of Care Reviewed With: patient        Progress: improving     Pt has no complaints.  Call light within reach.

## 2024-09-26 NOTE — PROGRESS NOTES
CARDIOLOGY  INPATIENT PROGRESS NOTE         [facility] INTENSIVE CARE UNIT    9/26/2024      PATIENT IDENTIFICATION:   Name:  Lul Meek      MRN:  [id number]     83 y.o.  female             Reason for visit: Complete heart block      SUBJECTIVE:    The patient is stable overnight, heart rate in the 40s.  Remains in third-degree AV block  OBJECTIVE:  Vitals:    09/26/24 0600 09/26/24 0615 09/26/24 0630 09/26/24 0645   BP:  149/45 (!) 135/33 (!) 125/35   Pulse: 62 (!) 43 58 (!) 43   Resp:       Temp: 98.2 °F (36.8 °C) 98.2 °F (36.8 °C) 98.4 °F (36.9 °C) 98.6 °F (37 °C)   TempSrc:       SpO2: 95% 95% 96% 95%   Weight:       Height:               Body mass index is 28.12 kg/m².    Intake/Output Summary (Last 24 hours) at 9/26/2024 0810  Last data filed at 9/26/2024 0600  Gross per 24 hour   Intake 240 ml   Output 1625 ml   Net -1385 ml       Telemetry: Sinus rhythm with third-degree AV block    Review of Systems   Constitutional:  Positive for fatigue.         Exam:  General appearance no acute distress    well nourished   HEENT sclerae non-icteric    lips not cyanotic   Respiratory rate and depth normal    normal breath sounds    no rales, no wheeze   Cardiovascular JVP normal    regular rhythm, bradycardic    S1 normal, S2 normal    no S3, no S4    no murmur    lower extremity edema:none   Abdominal bowel sounds normal    abdomen soft, non-tender    no hepatosplenomegaly   Neuro-psych oriented to person, place, time    cooperative     Allergies   Allergen Reactions    Triple Antibiotic Plus [Blake-Bacit-Poly-Lidocaine] Rash     Scheduled meds:  senna-docusate sodium, 2 tablet, Oral, BID  sodium chloride, 10 mL, Intravenous, Q12H      IV meds:                         Data Review:  Results from last 7 days   Lab Units 09/26/24  0247 09/25/24  1619   SODIUM mmol/L 139 137   BUN mg/dL 18 16   CREATININE mg/dL 0.85 0.81   GLUCOSE mg/dL 148* 131*             Estimated Creatinine Clearance: 43.8 mL/min (by C-G  "formula based on SCr of 0.85 mg/dL).  Results from last 7 days   Lab Units 09/26/24  0247 09/25/24  1619   WBC 10*3/mm3 7.36 8.33   HEMOGLOBIN g/dL 10.0* 10.8*             No results found for: \"DIGOXIN\"   No results found for: \"TSH\"        Invalid input(s): \"LDLCALC\"            Imaging (last 24 hr):   Imaging Results (Last 24 Hours)       ** No results found for the last 24 hours. **              ASSESSMENT:     Complete heart block    Essential hypertension    HLD (hyperlipidemia)        PLAN:  1.  The patient over the past month has had increased fatigue, questionable presyncopal or syncopal spell about 1 month ago.  She was taking her usual walk yesterday and felt like her legs were going to give out.  The complete heart block appears to be nonreversible.  I discussed at length the clinical findings, as well as the risk benefits and alternatives of a permanent pacemaker implant.  She is agreeable to proceed.  We will schedule this for later today.  2.  Further recommendations will depend on her hospital course            Seb Vega MD  9/26/2024    08:10 EDT           "

## 2024-09-26 NOTE — PROGRESS NOTES
[facility]   Cardiology Progress Note      Patient Name: Lul Meek  : 1940  MRN: [id number]  Primary Care Physician:  Iman Lemus APRN  Referring Physician: No Known Provider  Date of admission: 2024    Subjective   Subjective     Chief Complaint: Complete heart block    HPI:  Lul Meek is a 83 y.o. female with history of complete heart block.  No chest pain or shortness of breath.    REVIEW OF SYSTEMS    Constitutional:    No fever, no weight loss  Skin:     No rash  Otolaryngeal:    No difficulty swallowing  Cardiovascular:  No chest pain or shortness of breath  Pulmonary:    No cough, no sputum production    Objective    Objective     Vitals:   Vitals:    24 0715 24 0730 24 0745 24 0800   BP: (!) 124/31 128/42 (!) 158/38 (!) 135/38   Pulse: (!) 33 65 50 (!) 47   Resp:       Temp: 98.4 °F (36.9 °C) 98.6 °F (37 °C) 98.6 °F (37 °C) 98.8 °F (37.1 °C)   TempSrc:       SpO2: 95% 99% 95% 95%   Weight:       Height:                Physical Exam:   Constitutional: Awake, alert, No acute distress    Eyes: PERRLA, sclerae anicteric, no conjunctival injection   HENT: NCAT, mucous membranes moist   Neck: Supple, no thyromegaly, no lymphadenopathy, trachea midline   Respiratory: Clear to auscultation bilaterally, nonlabored respirations    Cardiovascular: RRR, no murmurs, rubs, or gallops, palpable pedal pulses bilaterally   Gastrointestinal: Positive bowel sounds, soft, nontender, nondistended   Musculoskeletal: No bilateral ankle edema, no clubbing or cyanosis to extremities   Psychiatric: Appropriate affect, cooperative   Neurologic: Oriented x 3, strength symmetric in all extremities, Cranial Nerves grossly intact to confrontation, speech clear   Skin: No rashes.      Current medications:  senna-docusate sodium, 2 tablet, Oral, BID  sodium chloride, 10 mL, Intravenous, Q12H      Current IV drips:       Result Review    Result Review:  I have personally reviewed the  "results from the time of this admission to 9/26/2024 08:43 EDT and agree with these findings:  []  Laboratory  []  EKG/Telemetry   []  Cardiology/Vascular   []  Radiology         CBC          9/25/2024    16:19 9/26/2024    02:47   CBC   WBC 8.33  7.36    RBC 3.59  3.31    Hemoglobin 10.8  10.0    Hematocrit 33.7  31.0    MCV 93.9  93.7    MCH 30.1  30.2    MCHC 32.0  32.3    RDW 13.6  13.7    Platelets 280  241      CMP          9/25/2024    16:19 9/26/2024    02:47   CMP   Glucose 131  148    BUN 16  18    Creatinine 0.81  0.85    EGFR 73.0  68.9    Sodium 137  139    Potassium 4.4  4.0    Chloride 103  106    Calcium 9.5  8.8    BUN/Creatinine Ratio 19.8  21.2    Anion Gap 12.9  9.9      Results for orders placed in visit on 05/22/23    Adult Transthoracic Echo Complete W/ Cont if Necessary Per Protocol    Interpretation Summary  Mild left ventricular hypertrophy with adequate left ventricular systolic function ejection fraction 53%.  Fibrocalcific mitral and aortic valves.  Mild to moderate aortic regurgitation.  Mild mild regurgitation.  Mild tricuspid regurgitation.        No results found for: \"PROBNP\"      Telemetry reviewed shows complete heart block    Assessment / Plan     ASSESSMENT:    Complete heart block    Essential hypertension    HLD (hyperlipidemia)        PLAN:  1.  Permanent pacemaker today.  2.  Continue current home meds.    Electronically signed by Edgar Bolanos MD, 09/26/24, 8:43 AM EDT.             "

## 2024-09-26 NOTE — SIGNIFICANT NOTE
09/26/24 1015   Coping/Psychosocial   Observed Emotional State calm   Verbalized Emotional State hopefulness   Trust Relationship/Rapport empathic listening provided   Involvement in Care interacting with patient   Additional Documentation Spiritual Care (Group)   Spiritual Care   Use of Spiritual Resources prayer   Spiritual Care Source  initiative   Spiritual Care Follow-Up follow-up, none required as presently assessed   Response to Spiritual Care receptive of support;engaged in conversation   Spiritual Care Interventions supportive conversation provided;prayer support provided   Spiritual Care Visit Type initial   Receptivity to Spiritual Care visit welcomed

## 2024-09-26 NOTE — PLAN OF CARE
Goal Outcome Evaluation:  Plan of Care Reviewed With: patient        Progress: improving  Outcome Evaluation: Patient went to cath lab today for a permanent pacemaker. Tylenol administered for pain. See MAR.

## 2024-09-26 NOTE — PROGRESS NOTES
[facility]   Hospitalist Progress Note  Date: 2024  Patient Name: Lul Meek  : 1940  MRN: [id number]  Date of admission: 2024  Room/Bed: I10/1      Subjective   Subjective     Chief Complaint: Complete heart block    Summary:Lul Meek is a 83 y.o. female went to see her PCP on 2024 for a routine visit and was found to be bradycardic, had complete heart block, was transferred to this facility.  Seen by cardiology on admission, temporary pacemaker was not deemed necessary and now she is getting a permanent pacemaker on 2024.  She has remained asymptomatic and otherwise doing well.    Interval Followup: no new complaints; no CP, palpitations, presyncope          Objective   Objective     Vitals:   Temp:  [97.3 °F (36.3 °C)-99 °F (37.2 °C)] 99 °F (37.2 °C)  Heart Rate:  [33-98] 34  Resp:  [20] 20  BP: (115-160)/() 130/46    Physical Exam   General: Awake, alert, NAD  HENT: NCAT, MMM  Eyes: pupils equal, no scleral icterus  Cardiovascular: kumar rate 30s  Pulmonary: CTA bilaterally; no wheezes; no conversational dyspnea  Gastrointestinal: S/ND/NT, +BS  Musculoskeletal: No gross deformities  Skin: No jaundice, no rash on exposed skin appreciated  Neuro: CN II through XII grossly intact; speech clear; no tremor    Result Review    Result Review:  I have personally reviewed these results:  [x]  Laboratory      Lab 24  02424  1619   WBC 7.36 8.33   HEMOGLOBIN 10.0* 10.8*   HEMATOCRIT 31.0* 33.7*   PLATELETS 241 280   NEUTROS ABS 4.08 5.02   IMMATURE GRANS (ABS) 0.02 0.09*   LYMPHS ABS 2.45 2.58   MONOS ABS 0.58 0.48   EOS ABS 0.20 0.13   MCV 93.7 93.9         Lab 24  0247 24  1619   SODIUM 139 137   POTASSIUM 4.0 4.4   CHLORIDE 106 103   CO2 23.1 21.1*   ANION GAP 9.9 12.9   BUN 18 16   CREATININE 0.85 0.81   EGFR 68.9 73.0   GLUCOSE 148* 131*   CALCIUM 8.8 9.5   TSH 2.730  --                          Brief Urine Lab Results       None          [x]   Microbiology   Microbiology Results (last 10 days)       ** No results found for the last 240 hours. **          [x]  Radiology  No radiology results for the last 7 days  []  EKG/Telemetry   []  Cardiology/Vascular   []  Pathology  []  Old records  []  Other:    Assessment & Plan   Assessment / Plan     Assessment:  Complete heart block  DM  HLD    Plan:  Admitted to the ICU  To get PPM today  Monitor sugar  Check TSH       Discussed with RN.    VTE Prophylaxis:  Mechanical VTE prophylaxis orders are present.        CODE STATUS:   Code Status (Patient has no pulse and is not breathing): CPR (Attempt to Resuscitate)  Medical Interventions (Patient has pulse or is breathing): Full Support      Electronically signed by Jose L Ag MD, 9/26/2024, 11:36 EDT.

## 2024-09-27 ENCOUNTER — READMISSION MANAGEMENT (OUTPATIENT)
Dept: CALL CENTER | Facility: HOSPITAL | Age: 84
End: 2024-09-27
Payer: MEDICARE

## 2024-09-27 ENCOUNTER — TELEPHONE (OUTPATIENT)
Dept: CARDIOLOGY | Facility: CLINIC | Age: 84
End: 2024-09-27

## 2024-09-27 VITALS
HEIGHT: 60 IN | TEMPERATURE: 98.8 F | SYSTOLIC BLOOD PRESSURE: 125 MMHG | OXYGEN SATURATION: 93 % | HEART RATE: 80 BPM | DIASTOLIC BLOOD PRESSURE: 99 MMHG | RESPIRATION RATE: 20 BRPM | WEIGHT: 144.62 LBS | BODY MASS INDEX: 28.39 KG/M2

## 2024-09-27 LAB
ANION GAP SERPL CALCULATED.3IONS-SCNC: 10.7 MMOL/L (ref 5–15)
BASOPHILS # BLD AUTO: 0.02 10*3/MM3 (ref 0–0.2)
BASOPHILS NFR BLD AUTO: 0.3 % (ref 0–1.5)
BUN SERPL-MCNC: 15 MG/DL (ref 8–23)
BUN/CREAT SERPL: 21.4 (ref 7–25)
CALCIUM SPEC-SCNC: 9.2 MG/DL (ref 8.6–10.5)
CHLORIDE SERPL-SCNC: 104 MMOL/L (ref 98–107)
CO2 SERPL-SCNC: 22.3 MMOL/L (ref 22–29)
CREAT SERPL-MCNC: 0.7 MG/DL (ref 0.57–1)
DEPRECATED RDW RBC AUTO: 46.4 FL (ref 37–54)
EGFRCR SERPLBLD CKD-EPI 2021: 87 ML/MIN/1.73
EOSINOPHIL # BLD AUTO: 0.17 10*3/MM3 (ref 0–0.4)
EOSINOPHIL NFR BLD AUTO: 2.4 % (ref 0.3–6.2)
ERYTHROCYTE [DISTWIDTH] IN BLOOD BY AUTOMATED COUNT: 13.6 % (ref 12.3–15.4)
GLUCOSE BLDC GLUCOMTR-MCNC: 169 MG/DL (ref 70–99)
GLUCOSE SERPL-MCNC: 144 MG/DL (ref 65–99)
HCT VFR BLD AUTO: 32.7 % (ref 34–46.6)
HGB BLD-MCNC: 10.7 G/DL (ref 12–15.9)
IMM GRANULOCYTES # BLD AUTO: 0.03 10*3/MM3 (ref 0–0.05)
IMM GRANULOCYTES NFR BLD AUTO: 0.4 % (ref 0–0.5)
LYMPHOCYTES # BLD AUTO: 1.88 10*3/MM3 (ref 0.7–3.1)
LYMPHOCYTES NFR BLD AUTO: 26.2 % (ref 19.6–45.3)
MCH RBC QN AUTO: 30.7 PG (ref 26.6–33)
MCHC RBC AUTO-ENTMCNC: 32.7 G/DL (ref 31.5–35.7)
MCV RBC AUTO: 93.7 FL (ref 79–97)
MONOCYTES # BLD AUTO: 0.64 10*3/MM3 (ref 0.1–0.9)
MONOCYTES NFR BLD AUTO: 8.9 % (ref 5–12)
NEUTROPHILS NFR BLD AUTO: 4.44 10*3/MM3 (ref 1.7–7)
NEUTROPHILS NFR BLD AUTO: 61.8 % (ref 42.7–76)
NRBC BLD AUTO-RTO: 0 /100 WBC (ref 0–0.2)
PLATELET # BLD AUTO: 216 10*3/MM3 (ref 140–450)
PMV BLD AUTO: 9.3 FL (ref 6–12)
POTASSIUM SERPL-SCNC: 4.4 MMOL/L (ref 3.5–5.2)
QT INTERVAL: 430 MS
QTC INTERVAL: 479 MS
RBC # BLD AUTO: 3.49 10*6/MM3 (ref 3.77–5.28)
SODIUM SERPL-SCNC: 137 MMOL/L (ref 136–145)
WBC NRBC COR # BLD AUTO: 7.18 10*3/MM3 (ref 3.4–10.8)

## 2024-09-27 PROCEDURE — 82948 REAGENT STRIP/BLOOD GLUCOSE: CPT | Performed by: INTERNAL MEDICINE

## 2024-09-27 PROCEDURE — 99024 POSTOP FOLLOW-UP VISIT: CPT | Performed by: SPECIALIST

## 2024-09-27 PROCEDURE — 80048 BASIC METABOLIC PNL TOTAL CA: CPT | Performed by: INTERNAL MEDICINE

## 2024-09-27 PROCEDURE — 25010000002 CEFAZOLIN PER 500 MG: Performed by: INTERNAL MEDICINE

## 2024-09-27 PROCEDURE — 85025 COMPLETE CBC W/AUTO DIFF WBC: CPT | Performed by: INTERNAL MEDICINE

## 2024-09-27 PROCEDURE — 99239 HOSP IP/OBS DSCHRG MGMT >30: CPT | Performed by: INTERNAL MEDICINE

## 2024-09-27 PROCEDURE — 93005 ELECTROCARDIOGRAM TRACING: CPT | Performed by: INTERNAL MEDICINE

## 2024-09-27 PROCEDURE — 93010 ELECTROCARDIOGRAM REPORT: CPT | Performed by: INTERNAL MEDICINE

## 2024-09-27 RX ADMIN — ACETAMINOPHEN 650 MG: 325 TABLET ORAL at 04:38

## 2024-09-27 RX ADMIN — Medication 10 ML: at 09:20

## 2024-09-27 RX ADMIN — ACETAMINOPHEN 650 MG: 325 TABLET ORAL at 00:14

## 2024-09-27 RX ADMIN — SODIUM CHLORIDE 2000 MG: 9 INJECTION, SOLUTION INTRAVENOUS at 04:38

## 2024-09-27 NOTE — PROGRESS NOTES
[facility]   Cardiology Progress Note      Patient Name: Lul Meek  : 1940  MRN: [id number]  Primary Care Physician:  Iman Lemus APRN  Referring Physician: No Known Provider  Date of admission: 2024    Subjective   Subjective     Chief Complaint: Complete heart block    HPI:  Lul Meek is a 83 y.o. female with complete heart block symptomatic status post permanent pacemaker    REVIEW OF SYSTEMS    Constitutional:    No fever, no weight loss  Skin:     No rash  Otolaryngeal:    No difficulty swallowing  Cardiovascular:  No chest pain or shortness of breath  Pulmonary:    No cough, no sputum production    Objective    Objective     Vitals:   Vitals:    24 0430 24 0500 24 0600 24 0700   BP:   153/70 154/64   Pulse: 73  61 79   Resp:       Temp: 97.3 °F (36.3 °C)  97.9 °F (36.6 °C) 98.1 °F (36.7 °C)   TempSrc:       SpO2: 92%  93% 92%   Weight:  65.6 kg (144 lb 10 oz)     Height:                Physical Exam:   Constitutional: Awake, alert, No acute distress    Eyes: PERRLA, sclerae anicteric, no conjunctival injection   HENT: NCAT, mucous membranes moist   Neck: Supple, no thyromegaly, no lymphadenopathy, trachea midline   Respiratory: Clear to auscultation bilaterally, nonlabored respirations    Cardiovascular: RRR, no murmurs, rubs, or gallops, palpable pedal pulses bilaterally   Gastrointestinal: Positive bowel sounds, soft, nontender, nondistended   Musculoskeletal: No bilateral ankle edema, no clubbing or cyanosis to extremities   Psychiatric: Appropriate affect, cooperative   Neurologic: Oriented x 3, strength symmetric in all extremities, Cranial Nerves grossly intact to confrontation, speech clear   Skin: No rashes.      Current medications:  acetaminophen, 650 mg, Oral, Q8H   Or  acetaminophen, 650 mg, Oral, Q8H   Or  acetaminophen, 650 mg, Rectal, Q8H  senna-docusate sodium, 2 tablet, Oral, BID  sodium chloride, 10 mL, Intravenous, Q12H  sodium  "chloride, 10 mL, Intravenous, Q12H      Current IV drips:  sodium chloride, 150 mL/hr, Last Rate: 150 mL/hr (09/26/24 1127)        Result Review    Result Review:  I have personally reviewed the results from the time of this admission to 9/27/2024 09:32 EDT and agree with these findings:  []  Laboratory  []  EKG/Telemetry   []  Cardiology/Vascular   []  Radiology         CBC          9/25/2024    16:19 9/26/2024    02:47 9/27/2024    03:14   CBC   WBC 8.33  7.36  7.18    RBC 3.59  3.31  3.49    Hemoglobin 10.8  10.0  10.7    Hematocrit 33.7  31.0  32.7    MCV 93.9  93.7  93.7    MCH 30.1  30.2  30.7    MCHC 32.0  32.3  32.7    RDW 13.6  13.7  13.6    Platelets 280  241  216      CMP          9/25/2024    16:19 9/26/2024    02:47 9/27/2024    03:14   CMP   Glucose 131  148  144    BUN 16  18  15    Creatinine 0.81  0.85  0.70    EGFR 73.0  68.9  87.0    Sodium 137  139  137    Potassium 4.4  4.0  4.4    Chloride 103  106  104    Calcium 9.5  8.8  9.2    BUN/Creatinine Ratio 19.8  21.2  21.4    Anion Gap 12.9  9.9  10.7      Results for orders placed in visit on 05/22/23    Adult Transthoracic Echo Complete W/ Cont if Necessary Per Protocol    Interpretation Summary  Mild left ventricular hypertrophy with adequate left ventricular systolic function ejection fraction 53%.  Fibrocalcific mitral and aortic valves.  Mild to moderate aortic regurgitation.  Mild mild regurgitation.  Mild tricuspid regurgitation.        No results found for: \"PROBNP\"      Telemetry reviewed shows pacemaker rhythm    Assessment / Plan     ASSESSMENT:    Complete heart block    Essential hypertension    HLD (hyperlipidemia)  Status post permanent pacemaker      PLAN:  1.  Pacemaker functioning normally.  2.  Can DC home on home meds  3.  Follow-up in office in 1 to 2 weeks.    Electronically signed by Edgar Bolanos MD, 09/27/24, 9:32 AM EDT.             "

## 2024-09-27 NOTE — PLAN OF CARE
Goal Outcome Evaluation:  Plan of Care Reviewed With: patient        Progress: improving     Surgery site monitored.  No bleeding.

## 2024-09-27 NOTE — TELEPHONE ENCOUNTER
Caller: [facility]    Relationship to patient: Provider    Best call back number:     New or established patient?  [] New  [x] Established    Date of discharge: 09-27-24    Facility discharged from: [facility]    Diagnosis/Symptoms: COMPLETE HEART BLOCK    Specialty Only: Did you see a [facility] provider?    [x] Yes  [] No  If so, who? DR. SANTANA, DR. ROSS, AND DR. LUTZ        2 WEEK TIMEFRAME  NO AVAILABILITY WITHIN TIME FRAME   Requesting   Requested Prescriptions     Pending Prescriptions Disp Refills    Tirzepatide (MOUNJARO) 12.5 MG/0.5ML Subcutaneous Solution Pen-injector 6 mL 0     Sig: Inject 12.5 mg into the skin once a week.      LOV: 02/07/24  RTC: 3mo  Last Relevant Labs:   Filled: 05/17/24 #6ml with 0 refills    No future appointments.

## 2024-09-28 NOTE — DISCHARGE SUMMARY
[facility]         HOSPITALIST  DISCHARGE SUMMARY    Patient Name: Lul Meek  : 1940  MRN: [id number]    Date of Admission: 2024  Date of Discharge:  2024  Primary Care Physician: Iman Lemus APRN  Admitting: Medicine  Consultants: Cardiology    Final Diagnoses:  Complete heart block s/p permanent pacemaker   DM  HLD           Hospital Course     Hospital Course:  Lul Meek is a 83 y.o. female went to see her PCP on 2024 for a routine visit and was found to be bradycardic, had complete heart block, was transferred to this facility.  Seen by cardiology on admission, temporary pacemaker was not deemed necessary and she underwent PPM placement ([employer] L3 3 1 serial #[id number]). Tolerated well and discharged home today.    DISCHARGE Follow Up Recommendations for labs and diagnostics: f/u with cardiology      Day of Discharge     Vital Signs:  Temp:  [97.3 °F (36.3 °C)-98.8 °F (37.1 °C)] 98.8 °F (37.1 °C)  Heart Rate:  [60-88] 80  BP: (101-167)/(61-99) 125/99  Physical Exam: nad s1s2 chest clear      Discharge Details        Discharge Medications        Continue These Medications        Instructions Start Date   atorvastatin 10 MG tablet  Commonly known as: LIPITOR   1 tablet, Oral, Daily      fluticasone 50 MCG/ACT nasal spray  Commonly known as: FLONASE   Administer 2 sprays into the nostril(s) as directed by provider.      losartan 100 MG tablet  Commonly known as: COZAAR   1 tablet, Oral, Daily      metFORMIN 500 MG tablet  Commonly known as: GLUCOPHAGE   1 tablet, Oral, Every 12 Hours Scheduled      montelukast 10 MG tablet  Commonly known as: SINGULAIR   Take 1 tablet by mouth Every Night.      pantoprazole 40 MG EC tablet  Commonly known as: PROTONIX   Take 1 tablet by mouth Daily.               Allergies   Allergen Reactions    Triple Antibiotic Plus [Blake-Bacit-Poly-Lidocaine] Rash       Discharge Disposition:  Home or Self  Care    Diet:  Hospital:No active diet order      Discharge Activity:       CODE STATUS:  Code Status and Medical Interventions: CPR (Attempt to Resuscitate); Full Support   Ordered at: 09/25/24 1529     Code Status (Patient has no pulse and is not breathing):    CPR (Attempt to Resuscitate)     Medical Interventions (Patient has pulse or is breathing):    Full Support         Future Appointments   Date Time Provider Department Center   10/7/2024 12:15 PM Edgar Bolanos MD [facility] CD [facility]   10/14/2024  9:30 AM [facility] CARD [place] DEVICE CHECK [facility]   3/10/2025 12:30 PM Edgar Bolanos MD [facility]           Pertinent  and/or Most Recent Results     PROCEDURES:   Ppm placement    LAB RESULTS:      Lab 09/27/24 0314 09/26/24 0247 09/25/24  1619   WBC 7.18 7.36 8.33   HEMOGLOBIN 10.7* 10.0* 10.8*   HEMATOCRIT 32.7* 31.0* 33.7*   PLATELETS 216 241 280   NEUTROS ABS 4.44 4.08 5.02   IMMATURE GRANS (ABS) 0.03 0.02 0.09*   LYMPHS ABS 1.88 2.45 2.58   MONOS ABS 0.64 0.58 0.48   EOS ABS 0.17 0.20 0.13   MCV 93.7 93.7 93.9         Lab 09/27/24 0314 09/26/24 0247 09/25/24  1619   SODIUM 137 139 137   POTASSIUM 4.4 4.0 4.4   CHLORIDE 104 106 103   CO2 22.3 23.1 21.1*   ANION GAP 10.7 9.9 12.9   BUN 15 18 16   CREATININE 0.70 0.85 0.81   EGFR 87.0 68.9 73.0   GLUCOSE 144* 148* 131*   CALCIUM 9.2 8.8 9.5   TSH  --  2.730  --                          Brief Urine Lab Results       None          Microbiology Results (last 10 days)       ** No results found for the last 240 hours. **            XR Chest 1 View    Result Date: 9/26/2024  Impression: No acute cardiopulmonary disease. Placement of a dual-lead transvenous pacemaker without evidence of complication Electronically Signed: Rocael Arizmendi MD  9/26/2024 3:05 PM EDT  Workstation ID: [id number]              Results for orders placed in visit on 05/22/23    Adult Transthoracic Echo Complete W/ Cont if Necessary Per Protocol    Interpretation  Summary  Mild left ventricular hypertrophy with adequate left ventricular systolic function ejection fraction 53%.  Fibrocalcific mitral and aortic valves.  Mild to moderate aortic regurgitation.  Mild mild regurgitation.  Mild tricuspid regurgitation.      Labs Pending at Discharge:        Time spent on Discharge including face to face service:  ~35 minutes    Electronically signed by Jose L Ag MD, 09/27/24, 8:23 PM EDT.

## 2024-09-30 LAB
QT INTERVAL: 589 MS
QTC INTERVAL: 463 MS

## 2024-10-03 NOTE — PROGRESS NOTES
[facility]  Cardiology progress Note    Patient Name: Lul Meek  : 1940    CHIEF COMPLAINT  Complete heart block        Subjective   Subjective     HISTORY OF PRESENT ILLNESS    Lul Meek is a 83 y.o. female with complete heart block status post permanent pacemaker    REVIEW OF SYSTEMS    Constitutional:    No fever, no weight loss  Skin:     No rash  Otolaryngeal:    No difficulty swallowing  Cardiovascular: See HPI.  Pulmonary:    No cough, no sputum production    Personal History     Social History:    reports that she has never smoked. She has never used smokeless tobacco. She reports that she does not drink alcohol and does not use drugs.    Home Medications:  Current Outpatient Medications on File Prior to Visit   Medication Sig    atorvastatin (LIPITOR) 10 MG tablet Take 1 tablet by mouth Daily.    fluticasone (FLONASE) 50 MCG/ACT nasal spray Administer 2 sprays into the nostril(s) as directed by provider.    losartan (COZAAR) 100 MG tablet Take 1 tablet by mouth Daily.    metFORMIN (GLUCOPHAGE) 500 MG tablet Take 1 tablet by mouth Every 12 (Twelve) Hours.    montelukast (SINGULAIR) 10 MG tablet Take 1 tablet by mouth Every Night.    pantoprazole (PROTONIX) 40 MG EC tablet Take 1 tablet by mouth Daily.     No current facility-administered medications on file prior to visit.       Past Medical History:   Diagnosis Date    Diabetes mellitus     Elevated cholesterol     Hyperlipidemia     Hypertension        Allergies:  Allergies   Allergen Reactions    Triple Antibiotic Plus [Blake-Bacit-Poly-Lidocaine] Rash       Objective    Objective       Vitals:   Heart Rate:  [92] 92  BP: (116)/(45) 116/45  Body mass index is 27.73 kg/m².     PHYSICAL EXAM:    General Appearance:   well developed  well nourished  HENT:   oropharynx moist  lips not cyanotic  Neck:  thyroid not enlarged  supple  Respiratory:  no respiratory distress  normal breath sounds  no rales  Cardiovascular:  no jugular  venous distention  regular rhythm  apical impulse normal  S1 normal, S2 normal  no S3, no S4   no murmur  no rub, no thrill  carotid pulses normal; no bruit  pedal pulses normal  lower extremity edema: none    Skin:   warm, dry  Psychiatric:  judgement and insight appropriate  normal mood and affect        Result Review:  I have personally reviewed the available results from  [x]  Laboratory  [x]  EKG  [x]  Cardiology  [x]  Medications  [x]  Old records  []  Other:     Procedures    Results for orders placed in visit on 05/22/23    Adult Transthoracic Echo Complete W/ Cont if Necessary Per Protocol    Interpretation Summary  Mild left ventricular hypertrophy with adequate left ventricular systolic function ejection fraction 53%.  Fibrocalcific mitral and aortic valves.  Mild to moderate aortic regurgitation.  Mild mild regurgitation.  Mild tricuspid regurgitation.     Impression/Plan:  1.  Complete heart block status post permanent pacemaker: Pacemaker site reviewed looks okay.  Permanent pacemaker functioning normally.  2.  Mild to moderate aortic regurgitation: Asymptomatic  3.  Essential hypertension: Continue losartan 100 mg once a day.  4.  Mixed hyperlipidemia: Continue Lipitor 10 mg once a day.        Edgar Bolanos MD   10/07/24   12:17 EDT

## 2024-10-07 ENCOUNTER — OFFICE VISIT (OUTPATIENT)
Dept: CARDIOLOGY | Facility: CLINIC | Age: 84
End: 2024-10-07
Payer: MEDICARE

## 2024-10-07 VITALS
HEIGHT: 60 IN | SYSTOLIC BLOOD PRESSURE: 116 MMHG | DIASTOLIC BLOOD PRESSURE: 45 MMHG | WEIGHT: 142 LBS | HEART RATE: 92 BPM | BODY MASS INDEX: 27.88 KG/M2

## 2024-10-07 DIAGNOSIS — I35.1 NONRHEUMATIC AORTIC VALVE INSUFFICIENCY: ICD-10-CM

## 2024-10-07 DIAGNOSIS — E78.2 HYPERLIPEMIA, MIXED: ICD-10-CM

## 2024-10-07 DIAGNOSIS — I10 HYPERTENSION, ESSENTIAL: ICD-10-CM

## 2024-10-07 DIAGNOSIS — Z95.0 PRESENCE OF PERMANENT CARDIAC PACEMAKER: Primary | ICD-10-CM

## 2024-10-07 PROCEDURE — 3078F DIAST BP <80 MM HG: CPT | Performed by: SPECIALIST

## 2024-10-07 PROCEDURE — 99214 OFFICE O/P EST MOD 30 MIN: CPT | Performed by: SPECIALIST

## 2024-10-07 PROCEDURE — 3074F SYST BP LT 130 MM HG: CPT | Performed by: SPECIALIST

## 2024-10-07 PROCEDURE — 1160F RVW MEDS BY RX/DR IN RCRD: CPT | Performed by: SPECIALIST

## 2024-10-07 PROCEDURE — 1159F MED LIST DOCD IN RCRD: CPT | Performed by: SPECIALIST

## 2025-01-10 NOTE — PROGRESS NOTES
[facility]  Cardiology progress Note    Patient Name: Lul Meek  : 1940    CHIEF COMPLAINT  Presence of pacemaker        Subjective   Subjective     HISTORY OF PRESENT ILLNESS    Lul Meek is a 84 y.o. female with presence of permanent pacemaker.    REVIEW OF SYSTEMS    Constitutional:    No fever, no weight loss  Skin:     No rash  Otolaryngeal:    No difficulty swallowing  Cardiovascular: See HPI.  Pulmonary:    No cough, no sputum production    Personal History     Social History:    reports that she has never smoked. She has never used smokeless tobacco. She reports that she does not drink alcohol and does not use drugs.    Home Medications:  Current Outpatient Medications on File Prior to Visit   Medication Sig    atorvastatin (LIPITOR) 10 MG tablet Take 1 tablet by mouth Daily.    fexofenadine (ALLEGRA) 60 MG tablet Take 1 tablet by mouth Daily.    losartan (COZAAR) 100 MG tablet Take 1 tablet by mouth Daily.    metFORMIN (GLUCOPHAGE) 500 MG tablet Take 1 tablet by mouth Every 12 (Twelve) Hours.    montelukast (SINGULAIR) 10 MG tablet Take 1 tablet by mouth Every Night.    pantoprazole (PROTONIX) 40 MG EC tablet Take 1 tablet by mouth Daily.    fluticasone (FLONASE) 50 MCG/ACT nasal spray Administer 2 sprays into the nostril(s) as directed by provider. (Patient not taking: Reported on 2025)     No current facility-administered medications on file prior to visit.       Past Medical History:   Diagnosis Date    Diabetes mellitus     Elevated cholesterol     Hyperlipidemia     Hypertension        Allergies:  Allergies   Allergen Reactions    Triple Antibiotic Plus [Blake-Bacit-Poly-Lidocaine] Rash       Objective    Objective       Vitals:   Heart Rate:  [82] 82  BP: (128)/(74) 128/74  Body mass index is 28.71 kg/m².     PHYSICAL EXAM:    General Appearance:   well developed  well nourished  HENT:   oropharynx moist  lips not cyanotic  Neck:  thyroid not  enlarged  supple  Respiratory:  no respiratory distress  normal breath sounds  no rales  Cardiovascular:  no jugular venous distention  regular rhythm  apical impulse normal  S1 normal, S2 normal  no S3, no S4   no murmur  no rub, no thrill  carotid pulses normal; no bruit  pedal pulses normal  lower extremity edema: none    Skin:   warm, dry  Psychiatric:  judgement and insight appropriate  normal mood and affect        Result Review:  I have personally reviewed the available results from  [x]  Laboratory  [x]  EKG  [x]  Cardiology  [x]  Medications  [x]  Old records  []  Other:     Procedures    Results for orders placed in visit on 05/22/23    Adult Transthoracic Echo Complete W/ Cont if Necessary Per Protocol    Interpretation Summary  Mild left ventricular hypertrophy with adequate left ventricular systolic function ejection fraction 53%.  Fibrocalcific mitral and aortic valves.  Mild to moderate aortic regurgitation.  Mild mild regurgitation.  Mild tricuspid regurgitation.     Impression/Plan:  1.  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  2.  Mild to moderate aortic regurgitation: Asymptomatic  3.  Essential hypertension: Continue losartan 100 mg once a day.  4.  Mixed hyperlipidemia: Continue Lipitor 10 mg once a day.           Edgar Bolanos MD   01/13/25   11:12 EST

## 2025-01-13 ENCOUNTER — OFFICE VISIT (OUTPATIENT)
Dept: CARDIOLOGY | Facility: CLINIC | Age: 85
End: 2025-01-13
Payer: MEDICARE

## 2025-01-13 ENCOUNTER — CLINICAL SUPPORT NO REQUIREMENTS (OUTPATIENT)
Dept: CARDIOLOGY | Facility: CLINIC | Age: 85
End: 2025-01-13
Payer: MEDICARE

## 2025-01-13 VITALS
HEIGHT: 60 IN | WEIGHT: 147 LBS | SYSTOLIC BLOOD PRESSURE: 128 MMHG | BODY MASS INDEX: 28.86 KG/M2 | HEART RATE: 82 BPM | DIASTOLIC BLOOD PRESSURE: 74 MMHG

## 2025-01-13 DIAGNOSIS — I10 HYPERTENSION, ESSENTIAL: ICD-10-CM

## 2025-01-13 DIAGNOSIS — E78.2 HYPERLIPEMIA, MIXED: ICD-10-CM

## 2025-01-13 DIAGNOSIS — Z95.0 PRESENCE OF PERMANENT CARDIAC PACEMAKER: ICD-10-CM

## 2025-01-13 DIAGNOSIS — Z95.0 PRESENCE OF PERMANENT CARDIAC PACEMAKER: Primary | ICD-10-CM

## 2025-01-13 DIAGNOSIS — I44.1 2ND DEGREE AV BLOCK: Primary | ICD-10-CM

## 2025-01-13 PROCEDURE — 1160F RVW MEDS BY RX/DR IN RCRD: CPT | Performed by: SPECIALIST

## 2025-01-13 PROCEDURE — 93280 PM DEVICE PROGR EVAL DUAL: CPT | Performed by: SPECIALIST

## 2025-01-13 PROCEDURE — 3074F SYST BP LT 130 MM HG: CPT | Performed by: SPECIALIST

## 2025-01-13 PROCEDURE — 3078F DIAST BP <80 MM HG: CPT | Performed by: SPECIALIST

## 2025-01-13 PROCEDURE — 99214 OFFICE O/P EST MOD 30 MIN: CPT | Performed by: SPECIALIST

## 2025-01-13 PROCEDURE — 1159F MED LIST DOCD IN RCRD: CPT | Performed by: SPECIALIST

## 2025-01-13 RX ORDER — FEXOFENADINE HCL 60 MG/1
60 TABLET, FILM COATED ORAL DAILY
COMMUNITY

## 2025-07-02 NOTE — PROGRESS NOTES
[facility]  Cardiology progress Note    Patient Name: Lul Meek  : 1940    CHIEF COMPLAINT  Presence of pacemaker        Subjective   Subjective     HISTORY OF PRESENT ILLNESS    Lul Meek is a 84 y.o. female with presence of pacemaker.    REVIEW OF SYSTEMS    Constitutional:    No fever, no weight loss  Skin:     No rash  Otolaryngeal:    No difficulty swallowing  Cardiovascular: See HPI.  Pulmonary:    No cough, no sputum production    Personal History     Social History:    reports that she has never smoked. She has never used smokeless tobacco. She reports that she does not drink alcohol and does not use drugs.    Home Medications:  Current Outpatient Medications on File Prior to Visit   Medication Sig    atorvastatin (LIPITOR) 10 MG tablet Take 1 tablet by mouth Daily.    dicyclomine (BENTYL) 20 MG tablet Take 1 tablet by mouth As Needed for Abdominal Cramping.    fexofenadine (ALLEGRA) 60 MG tablet Take 1 tablet by mouth Daily.    losartan (COZAAR) 50 MG tablet Take 1 tablet by mouth Every 12 (Twelve) Hours.    metFORMIN (GLUCOPHAGE) 500 MG tablet Take 1 tablet by mouth Every 12 (Twelve) Hours.    montelukast (SINGULAIR) 10 MG tablet Take 1 tablet by mouth Every Night.    pantoprazole (PROTONIX) 40 MG EC tablet Take 1 tablet by mouth Daily.    sucralfate (CARAFATE) 1 g tablet TAKE ONE TABLET BY MOUTH FOUR TIMES DAILY BEFORE MEALS    [DISCONTINUED] losartan (COZAAR) 100 MG tablet Take 1 tablet by mouth Daily.     No current facility-administered medications on file prior to visit.       Past Medical History:   Diagnosis Date    Diabetes mellitus     Elevated cholesterol     Hyperlipidemia     Hypertension        Allergies:  Allergies   Allergen Reactions    Triple Antibiotic Plus [Blake-Bacit-Poly-Lidocaine] Rash       Objective    Objective       Vitals:   Heart Rate:  [81] 81  BP: (126)/(43) 126/43  Body mass index is 30.39 kg/m².     PHYSICAL EXAM:    General Appearance:    well developed  well nourished  HENT:   oropharynx moist  lips not cyanotic  Neck:  thyroid not enlarged  supple  Respiratory:  no respiratory distress  normal breath sounds  no rales  Cardiovascular:  no jugular venous distention  regular rhythm  apical impulse normal  S1 normal, S2 normal  no S3, no S4   no murmur  no rub, no thrill  carotid pulses normal; no bruit  pedal pulses normal  lower extremity edema: none    Skin:   warm, dry  Psychiatric:  judgement and insight appropriate  normal mood and affect        Result Review:  I have personally reviewed the available results from  [x]  Laboratory  [x]  EKG  [x]  Cardiology  [x]  Medications  [x]  Old records  []  Other:     Procedures    Results for orders placed in visit on 05/22/23    Adult Transthoracic Echo Complete W/ Cont if Necessary Per Protocol 05/22/2023 12:14 PM    Interpretation Summary  Mild left ventricular hypertrophy with adequate left ventricular systolic function ejection fraction 53%.  Fibrocalcific mitral and aortic valves.  Mild to moderate aortic regurgitation.  Mild mild regurgitation.  Mild tricuspid regurgitation.     Impression/Plan:  1.  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  2.  Mild to moderate aortic regurgitation: Asymptomatic  3.  Essential hypertension: Continue losartan 50 mg twice a day.  4.  Mixed hyperlipidemia: Continue Lipitor 10 mg once a day.              Edgar Bolanos MD   07/07/25   12:31 EDT

## 2025-07-07 ENCOUNTER — OFFICE VISIT (OUTPATIENT)
Dept: CARDIOLOGY | Facility: CLINIC | Age: 85
End: 2025-07-07
Payer: MEDICARE

## 2025-07-07 VITALS
BODY MASS INDEX: 30.55 KG/M2 | SYSTOLIC BLOOD PRESSURE: 126 MMHG | DIASTOLIC BLOOD PRESSURE: 43 MMHG | HEIGHT: 60 IN | HEART RATE: 81 BPM | WEIGHT: 155.6 LBS

## 2025-07-07 DIAGNOSIS — Z95.0 PRESENCE OF PERMANENT CARDIAC PACEMAKER: ICD-10-CM

## 2025-07-07 DIAGNOSIS — I10 HYPERTENSION, ESSENTIAL: Primary | ICD-10-CM

## 2025-07-07 DIAGNOSIS — E78.2 HYPERLIPEMIA, MIXED: ICD-10-CM

## 2025-07-07 PROCEDURE — 1159F MED LIST DOCD IN RCRD: CPT | Performed by: SPECIALIST

## 2025-07-07 PROCEDURE — 3074F SYST BP LT 130 MM HG: CPT | Performed by: SPECIALIST

## 2025-07-07 PROCEDURE — 3078F DIAST BP <80 MM HG: CPT | Performed by: SPECIALIST

## 2025-07-07 PROCEDURE — 99214 OFFICE O/P EST MOD 30 MIN: CPT | Performed by: SPECIALIST

## 2025-07-07 PROCEDURE — 1160F RVW MEDS BY RX/DR IN RCRD: CPT | Performed by: SPECIALIST

## 2025-07-07 RX ORDER — SUCRALFATE 1 G/1
TABLET ORAL
COMMUNITY
Start: 2025-04-30

## 2025-07-07 RX ORDER — LOSARTAN POTASSIUM 50 MG/1
1 TABLET ORAL EVERY 12 HOURS SCHEDULED
COMMUNITY
Start: 2025-06-11

## 2025-07-07 RX ORDER — DICYCLOMINE HCL 20 MG
20 TABLET ORAL AS NEEDED
COMMUNITY
Start: 2025-06-16

## (undated) DEVICE — PACEMAKER PACK: Brand: MEDLINE INDUSTRIES, INC.

## (undated) DEVICE — INTRO TEAR AWAY/LVD W/SD PRT 6F 13CM

## (undated) DEVICE — PENCL E/S SMOKEEVAC W/TELESCP CANN

## (undated) DEVICE — KT INTRO MIC VSI SMOTH STFF 4F 40CM 7CM

## (undated) DEVICE — INTRO TEAR AWAY/LVD W/SD PRT 7F 13CM